# Patient Record
Sex: MALE | Race: WHITE | NOT HISPANIC OR LATINO | Employment: FULL TIME | URBAN - METROPOLITAN AREA
[De-identification: names, ages, dates, MRNs, and addresses within clinical notes are randomized per-mention and may not be internally consistent; named-entity substitution may affect disease eponyms.]

---

## 2018-04-26 ENCOUNTER — HOSPITAL ENCOUNTER (EMERGENCY)
Facility: CLINIC | Age: 38
Discharge: HOME | End: 2018-04-26
Attending: EMERGENCY MEDICINE | Admitting: EMERGENCY MEDICINE
Payer: COMMERCIAL

## 2018-04-26 ENCOUNTER — APPOINTMENT (OUTPATIENT)
Dept: GENERAL RADIOLOGY | Facility: CLINIC | Age: 38
End: 2018-04-26
Attending: EMERGENCY MEDICINE
Payer: COMMERCIAL

## 2018-04-26 VITALS
RESPIRATION RATE: 16 BRPM | TEMPERATURE: 97.5 F | OXYGEN SATURATION: 96 % | SYSTOLIC BLOOD PRESSURE: 102 MMHG | DIASTOLIC BLOOD PRESSURE: 73 MMHG

## 2018-04-26 DIAGNOSIS — R07.89 ATYPICAL CHEST PAIN: ICD-10-CM

## 2018-04-26 LAB
INTERPRETATION ECG - MUSE: NORMAL
TROPONIN I SERPL-MCNC: <0.015 UG/L (ref 0–0.04)

## 2018-04-26 PROCEDURE — 84484 ASSAY OF TROPONIN QUANT: CPT | Performed by: EMERGENCY MEDICINE

## 2018-04-26 PROCEDURE — 99285 EMERGENCY DEPT VISIT HI MDM: CPT | Mod: 25

## 2018-04-26 PROCEDURE — 93005 ELECTROCARDIOGRAM TRACING: CPT

## 2018-04-26 PROCEDURE — 36415 COLL VENOUS BLD VENIPUNCTURE: CPT | Performed by: EMERGENCY MEDICINE

## 2018-04-26 PROCEDURE — 71046 X-RAY EXAM CHEST 2 VIEWS: CPT

## 2018-04-26 ASSESSMENT — ENCOUNTER SYMPTOMS
SHORTNESS OF BREATH: 0
LIGHT-HEADEDNESS: 0
VOMITING: 0
NAUSEA: 0
ABDOMINAL PAIN: 0
FEVER: 0
CHILLS: 0

## 2018-04-26 NOTE — DISCHARGE INSTRUCTIONS
*CHEST PAIN, UNCERTAIN CAUSE    Based on your exam today, the exact cause of your chest pain is not certain. Your condition does not seem serious at this time, and your pain does not appear to be coming from your heart. However, sometimes the signs of a serious problem take more time to appear. Therefore, watch for the warning signs listed below.  HOME CARE:    1. Rest today and avoid strenuous activity.  2. Take any prescribed medicine as directed.  FOLLOW UP with your doctor in 1-3 days.   GET PROMPT MEDICAL ATTENTION if any of the following occur:    A change in the type of pain: if it feels different, becomes more severe, lasts longer, or begins to spread into your shoulder, arm, neck, jaw or back    Shortness of breath or increased pain with breathing    Weakness, dizziness, or fainting    Cough with blood or dark colored sputum (phlegm)    Fever over 101  F (38.3  C)    Swelling, pain or redness in one leg    9902-4109 [employer]. [address] [place] [address]. All rights reserved. This information is not intended as a substitute for professional medical care. Always follow your healthcare professional's instructions.  This information has been modified by your health care provider with permission from the publisher.

## 2018-04-26 NOTE — ED PROVIDER NOTES
History     Chief Complaint:  Chest pain     HPI   Deon Chaparro is a 37-year-old male with history of hypothyroidism who presents to the emergency room for evaluation of left side pain that has been intermittent for the last 2 weeks but was worse tonight.  The patient reports that the pain is right under his left armpit and does travel up into his left deltoid and left neck.  He states that today he also felt a little bit fatigued and had a headache, but he denies any nausea, vomiting, shortness of breath, diaphoresis, abdominal pain, fever, cough, or other symptoms.  The patient has had no injury or trauma to the area.  He states that the pain is not worse with movement and onset usually happens while he is at rest.  The patient states that the pain is not worse when he is lying down or when he touches it.  He states that he has never had pain like this in the past before.  The patient reports that the pain had subsided prior to arrival tonight.      Cardiac Risk Factors      Sex:     Male   Tobacco:    POSITIVE (former smoker)    Hypertension:    Negative   Diabetes:    Negative   Hyperlipidemia:   Negative   Family History:   Negative       Allergies:  No known drug allergies.     Medications:    Levothyroxine     Past Medical History:    GERD  Hypothyroidism      Past Surgical History:    Dental surgery     Family History:    Diabetes   Kidney stones     Social History:  Marital Status:    Presents to the ED alone   Tobacco Use: Former smoker   Alcohol Use: Minimal   PCP: Sudhir Wood      Review of Systems   Constitutional: Negative for chills and fever.   Respiratory: Negative for shortness of breath.    Cardiovascular: Positive for chest pain (left side under armpit).   Gastrointestinal: Negative for abdominal pain, nausea and vomiting.   Neurological: Negative for light-headedness.   All other systems reviewed and are negative.    Physical Exam   First Vitals:  BP: 116/83  Heart Rate:  79  Temp: 97.5  F (36.4  C)  Resp: 16  SpO2: 99 %    Physical Exam  Constitutional: Alert, attentive, GCS 15, well appearing young male in no acute distress.           HENT: normocephalic, atraumatic   Eyes: Normal conjunctiva. Pupils are equal, round, and reactive to light.   CV: regular rate and rhythm; no murmurs, rubs or gallups  Chest: Effort normal and breath sounds normal. No wheezing, rhonchi, or rales. No reproducible chest wall pain. No crepitus.    GI:  There is no tenderness. No distension. Normal bowel sounds  MSK: Normal range of motion, no peripheral edema or calf tenderness to palpation  Neurological: Alert, attentive, oriented x4, strength grossly intact  Skin: Skin is warm and dry. No rashes.    Emergency Department Course   ECG:  @ 0017  Indication: Chest pain   Vent. Rate 66 bpm. VT interval 170 ms. QRS duration 88 ms. QT/QTc 392/410 ms. P-R-T axis 45 81 69.   Normal sinus rhythm. Normal ECG.  Read @ 0100 by Dr. Sepulveda.     Imaging:  XR Chest 2 Views  No infiltrates or other acute findings. Heart size is within normal limits. No pneumothorax.  Report per radiology:  Wilder Weber MD (04/26/18 03:02:12)    Radiographic findings were communicated with the patient who voiced understanding of the findings.    Laboratory:  Troponin I: <0.015 (WNL)      Emergency Department Course:  EKG was done, interpretation as above.     Nursing notes and vitals reviewed.    (0115) I entered the room with my scribe, obtained the history, and performed an exam of the patient as documented above.    EKG was done, interpretation as above.     A peripheral IV was established. Blood was drawn from the patient. This was sent for laboratory testing, findings above.      The patient was sent for a chest x-ray while in the emergency department, findings above.      (0307) I personally reviewed the results with the patient and answered all related questions prior to discharge.       Findings and plan explained to the  patient. Patient discharged home with instructions regarding supportive care, medications, and reasons to return. The importance of close follow-up was reviewed.     Impression & Plan    Medical Decision Making:  Deon Chaparro is a 37 year old male who presents with chest pain in left axilla.  The work up in the Emergency Department is negative.  I considered a broad differential diagnosis in this patient including life-threatening etiologies such as acute coronary syndrome, myocardial infarction, pulmonary embolism, acute aortic dissection, myocarditis, pericarditis, acute valvular insufficiency amongst others.  Other causes considered for this patient included pneumonia, pneumothorax, chest wall source, pericarditis, pleurisy, esophageal spasm, etc.  No serious etiology for the chest pain were detected today during this visit.  Troponin and EKG reassuring (multiple days of symptoms).  PERC negative.  CXR is reassuring.  Close follow up with primary care is indicated should the pain continue, as further work up may be performed; this was made clear to the patient, who understands.     Diagnosis:    ICD-10-CM   1. Atypical chest pain R07.89     Disposition:  Discharge        [facility] EMERGENCY DEPARTMENT      Scribe disclosure:  I, Quique Jolly, am serving as a scribe on 4/26/2018 at 1:15 AM to personally document services performed by Dr. Sepulveda based on my observations and the provider's statements to me.                Patricia Sepulveda MD  04/26/18 0116

## 2018-04-26 NOTE — ED AVS SNAPSHOT
[facility] Emergency Department    [address]    [facility] [address]    Phone:  [phone]    Fax:  [phone]                                       Deon Chaparro   MRN: [id number]    Department:  [facility] Emergency Department   Date of Visit:  4/26/2018           Patient Information     Date Of Birth          1980        Your diagnoses for this visit were:     Atypical chest pain        You were seen by Patricia Sepulveda MD.      Follow-up Information     Follow up with [facility]. Schedule an appointment as soon as possible for a visit in 1 week.    Specialty:  Family Medicine    Why:  for recheck     Contact information:    [address] [facility] [address] [phone]        Follow up with [facility] Emergency Department.    Specialty:  EMERGENCY MEDICINE    Why:  If symptoms worsen    Contact information:    [address]  [facility] [id number] [phone]        Discharge Instructions          *CHEST PAIN, UNCERTAIN CAUSE    Based on your exam today, the exact cause of your chest pain is not certain. Your condition does not seem serious at this time, and your pain does not appear to be coming from your heart. However, sometimes the signs of a serious problem take more time to appear. Therefore, watch for the warning signs listed below.  HOME CARE:    1. Rest today and avoid strenuous activity.  2. Take any prescribed medicine as directed.  FOLLOW UP with your doctor in 1-3 days.   GET PROMPT MEDICAL ATTENTION if any of the following occur:    A change in the type of pain: if it feels different, becomes more severe, lasts longer, or begins to spread into your shoulder, arm, neck, jaw or back    Shortness of breath or increased pain with breathing    Weakness, dizziness, or fainting    Cough with blood or dark colored sputum (phlegm)    Fever over 101  F (38.3  C)    Swelling,  pain or redness in one leg    6688-3067 [employer]. [address] [place] [address], [place] [address]. All rights reserved. This information is not intended as a substitute for professional medical care. Always follow your healthcare professional's instructions.  This information has been modified by your health care provider with permission from the publisher.      24 Hour Appointment Hotline       To make an appointment at any [facility], call [phone] ([phone]). If you don't have a family doctor or clinic, we will help you find one. [facility] are conveniently located to serve the needs of you and your family.             Review of your medicines      Our records show that you are taking the medicines listed below. If these are incorrect, please call your family doctor or clinic.        Dose / Directions Last dose taken    IBUPROFEN PO        Refills:  0        LEVOTHYROXINE SODIUM PO   Dose:  125 mcg        Take 125 mcg by mouth   Refills:  0                Procedures and tests performed during your visit     Troponin I (now)    XR Chest 2 Views      Orders Needing Specimen Collection     None      Pending Results     Date and Time Order Name Status Description    4/26/2018 0151 XR Chest 2 Views Preliminary             Pending Culture Results     No orders found from 4/24/2018 to 4/27/2018.            Pending Results Instructions     If you had any lab results that were not finalized at the time of your Discharge, you can call the ED Lab Result RN at [phone]. You will be contacted by this team for any positive Lab results or changes in treatment. The nurses are available 7 days a week from 10A to 6:30P.  You can leave a message 24 hours per day and they will return your call.        Test Results From Your Hospital Stay        4/26/2018  2:03 AM      Component Results     Component Value Ref Range & Units Status    Troponin I ES <0.015 0.000 - 0.045 ug/L Final    The 99th  percentile for upper reference range is 0.045 ug/L.  Troponin values   in the range of 0.045 - 0.120 ug/L may be associated with risks of adverse   clinical events.           4/26/2018  2:28 AM      Narrative     XR CHEST 2 VW  4/26/2018 2:23 AM     INDICATION: Left chest pain, evaluate for pneumothorax.    COMPARISON: None.        Impression     IMPRESSION: No infiltrates or other acute findings. Heart size is  within normal limits. No pneumothorax.                Clinical Quality Measure: Blood Pressure Screening     Your blood pressure was checked while you were in the emergency department today. The last reading we obtained was  BP: 102/73 . Please read the guidelines below about what these numbers mean and what you should do about them.  If your systolic blood pressure (the top number) is less than 120 and your diastolic blood pressure (the bottom number) is less than 80, then your blood pressure is normal. There is nothing more that you need to do about it.  If your systolic blood pressure (the top number) is 120-139 or your diastolic blood pressure (the bottom number) is 80-89, your blood pressure may be higher than it should be. You should have your blood pressure rechecked within a year by a primary care provider.  If your systolic blood pressure (the top number) is 140 or greater or your diastolic blood pressure (the bottom number) is 90 or greater, you may have high blood pressure. High blood pressure is treatable, but if left untreated over time it can put you at risk for heart attack, stroke, or kidney failure. You should have your blood pressure rechecked by a primary care provider within the next 4 weeks.  If your provider in the emergency department today gave you specific instructions to follow-up with your doctor or provider even sooner than that, you should follow that instruction and not wait for up to 4 weeks for your follow-up visit.        Thank you for choosing [facility]       Thank you for  "choosing [facility] for your care. Our goal is always to provide you with excellent care. Hearing back from our patients is one way we can continue to improve our services. Please take a few minutes to complete the written survey that you may receive in the mail after you visit with us. Thank you!        [employer] Information     [employer] lets you send messages to your doctor, view your test results, renew your prescriptions, schedule appointments and more. To sign up, go to [email or web address] . Click on \"Log in\" on the left side of the screen, which will take you to the Welcome page. Then click on \"Sign up Now\" on the right side of the page.     You will be asked to enter the access code listed below, as well as some personal information. Please follow the directions to create your username and password.     Your access code is: [email or web address]  Expires: 2018  3:00 AM     Your access code will  in 90 days. If you need help or a new code, please call your [facility] clinic or [phone].        Care EveryWhere ID     This is your Care EveryWhere ID. This could be used by other organizations to access your [facility] medical records  [id number]        Equal Access to Services     [place] : Margarette Marin, tigre arvizu, qaloly kaemili lechuga, lamar herron. So [facility] [phone].    ATENCIÓN: Si habla español, tiene a esquivel disposición servicios gratuitos de asistencia lingüística. Llame al [phone].    We comply with applicable federal civil rights laws and [place] laws. We do not discriminate on the basis of race, color, national origin, age, disability, sex, sexual orientation, or gender identity.            After Visit Summary       This is your record. Keep this with you and show to your community pharmacist(s) and doctor(s) at your next visit.                  "

## 2018-04-26 NOTE — ED TRIAGE NOTES
"Patient here for evaluation of CP under left armpit for several weeks. Has been a \"minor annoyance.\" Tonight, intensity increased and is more persistent.   "

## 2018-04-26 NOTE — ED AVS SNAPSHOT
[facility] Emergency Department    [address]    [facility] [address]    Phone:  [phone]    Fax:  [phone]                                       Deon Chaparro   MRN: [id number]    Department:  [facility] Emergency Department   Date of Visit:  4/26/2018           After Visit Summary Signature Page     I have received my discharge instructions, and my questions have been answered. I have discussed any challenges I see with this plan with the nurse or doctor.    ..........................................................................................................................................  Patient/Patient Representative Signature      ..........................................................................................................................................  Patient Representative Print Name and Relationship to Patient    ..................................................               ................................................  Date                                            Time    ..........................................................................................................................................  Reviewed by Signature/Title    ...................................................              ..............................................  Date                                                            Time

## 2019-07-10 ENCOUNTER — HOSPITAL ENCOUNTER (EMERGENCY)
Facility: CLINIC | Age: 39
Discharge: HOME | End: 2019-07-11
Attending: PHYSICIAN ASSISTANT | Admitting: PHYSICIAN ASSISTANT
Payer: COMMERCIAL

## 2019-07-10 VITALS
TEMPERATURE: 97.9 F | RESPIRATION RATE: 18 BRPM | DIASTOLIC BLOOD PRESSURE: 80 MMHG | OXYGEN SATURATION: 100 % | SYSTOLIC BLOOD PRESSURE: 127 MMHG | WEIGHT: 207.23 LBS

## 2019-07-10 DIAGNOSIS — H60.501 ACUTE OTITIS EXTERNA OF RIGHT EAR, UNSPECIFIED TYPE: ICD-10-CM

## 2019-07-10 PROCEDURE — 99282 EMERGENCY DEPT VISIT SF MDM: CPT

## 2019-07-10 NOTE — ED AVS SNAPSHOT
[facility] Emergency Department  [address]  [facility] [address]  Phone:  [phone]  Fax:  [phone]                                    Deon Chaparro   MRN: [id number]    Department:  [facility] Emergency Department   Date of Visit:  7/10/2019           After Visit Summary Signature Page    I have received my discharge instructions, and my questions have been answered. I have discussed any challenges I see with this plan with the nurse or doctor.    ..........................................................................................................................................  Patient/Patient Representative Signature      ..........................................................................................................................................  Patient Representative Print Name and Relationship to Patient    ..................................................               ................................................  Date                                   Time    ..........................................................................................................................................  Reviewed by Signature/Title    ...................................................              ..............................................  Date                                               Time          22EPIC Rev 08/18

## 2019-07-11 RX ORDER — NEOMYCIN SULFATE, POLYMYXIN B SULFATE, HYDROCORTISONE 3.5; 10000; 1 MG/ML; [USP'U]/ML; MG/ML
3 SOLUTION/ DROPS AURICULAR (OTIC) 4 TIMES DAILY
Qty: 10 ML | Refills: 0 | Status: SHIPPED | OUTPATIENT
Start: 2019-07-11 | End: 2022-09-16

## 2019-07-11 NOTE — ED PROVIDER NOTES
History     Chief Complaint:  Otalgia    HPI   Deon Chaparro is a 38 year old male who presents to the emergency department with right ear otalgia. He reports the pain began last night and is now radiating down towards his neck which prompted him to present to the ED. He states he has not had this pain before. Additionally, the patient recently had his right ear irrigated by his primary provider.Denies fevers, drainage from the ears, or other concerns.     Allergies:  NKDA    Medications:    Levothyroxine    Past Medical History:    Esophageal Reflux  Thyroid disease    Past Surgical History:    Dental surgery    Family History:    Genitourinary problems    Social History:  Former smoker  Positive for alcohol use. Comment: minimal  Marital Status:   [2]       Review of Systems   HENT: Positive for ear pain.    All other systems reviewed and are negative.      Physical Exam     Patient Vitals for the past 24 hrs:   BP Temp Temp src Heart Rate Resp SpO2 Weight   07/10/19 2357 127/80 97.9  F (36.6  C) Temporal 70 18 100 % 94 kg (207 lb 3.7 oz)     Physical Exam  General: Alert, interactive. GCS 15  Head:  Scalp is atraumatic.  Eyes:  EOM intact. The pupils are equal, round, and reactive to light. No scleral icterus.   ENT:                                      Ears:   Left external canals normal.  Right external canal swollen with narrowing of the canal. Purulent discharge. Visualized TM normal without out erythema.   Nose:  The external nose is normal.  Throat:  The oropharynx is normal. Mucus membranes are moist.                 Neck:  Normal range of motion. There is no rigidity. No LAD.    CV:  Regular rate and rhythm. No murmur. 2+ radial pulses  Resp:  Breath sounds are clear bilaterally. Non-labored, no retractions or accessory muscle use.  MS:  Normal range of motion.   Skin:  Warm and dry.   Neuro:  Strength and sensation grossly intact.   Psych:  Awake. Alert.  Appropriate interactions.        Emergency Department Course   Emergency Department Course:  Nursing notes and vitals reviewed. (0014) I performed an exam of the patient as documented above.     I rechecked the patient and discussed the results of his workup thus far.      Findings and plan explained to the Patient. Patient discharged home with instructions regarding supportive care, medications, and reasons to return. The importance of close follow-up was reviewed. The patient was prescribed Cortisporin     I personally reviewed the laboratory results with the Patient and answered all related questions prior to discharge.     Impression & Plan    Medical Decision Making:  Deon Chaparro is a 38 year old male who presents for evaluation of otalgia on the right side.  The patient has an exam consistent with otitis externa.  Differential considered in this patient with otalgia included mastoiditis, meningitis, perforation, cerumen impaction, mass, dental abscess, or peritonsillar abscess, referred pain, cholesteatoma, otitis externa, etc.   Drops for the externa are noted below. Return if increasing pain, fever, decrease in hearing or ear discharge.  Follow-up with primary physician in 3-5  days, if symptoms persist and ENT consultation may be needed as outpatient.      Diagnosis:    ICD-10-CM    1. Acute otitis externa of right ear, unspecified type H60.501        Disposition:  discharged to home    Discharge Medications:     Medication List      Started    neomycin-polymyxin-hydrocortisone 3.5-74308-2 otic solution  Commonly known as:  CORTISPORIN  3 drops, Right Ear, 4 TIMES DAILY        Scribe Disclosure:  IMahdi Collier, am serving as a scribe on 7/11/2019 at 2:24 AM to personally document services performed by Tracey Campos PA-C providers found based on my observations and the provider's statements to me.     7/10/2019   [facility] EMERGENCY DEPARTMENT       Tracey Campos PA-C  07/11/19 2159

## 2019-07-11 NOTE — DISCHARGE INSTRUCTIONS
"Discharge Instructions  Otitis Externa    Today you were seen for otitis externa which is a condition that occurs when the ear canal, which is the area that leads from the outer ear to the ear drum, becomes irritated as a result of an infection, allergy, or skin problem.   The most common symptoms of this are:  pain in the outer ear, especially when the ear is moved, itchiness of the ear, fluid or pus leaking from the ear and difficulty hearing clearly.  \"Swimmer's ear\" is the name for external otitis that occurs in a person who swims frequently.    Generally, every Emergency Department visit should have a follow-up clinic visit with either a primary or a specialty clinic/provider. Please follow-up as instructed by your emergency provider today.    Return to the Emergency Department if:  Your symptoms get worse, or if you develop a severe headache, stiff neck, or new symptoms.  The pain and swelling spread to your face.  You develop high fever.      Treatment:  Treatment of otitis externa aims to reduce pain and eliminate the infection.   You should take either Advil  (ibuprofen) or Tylenol  (acetaminophen) for control of the ear pain.    Ear drops -- Ear drops are usually prescribed to both reduce pain and swelling and kill the bacteria which causes external otitis. It is important to apply the ear drops correctly so that they reach the ear canal:  Lie on your side or tilt your head towards the opposite shoulder.  Fill the ear canal with drops.  Lie on your side for 20 minutes or place a cotton ball in the ear canal for 20 minutes.  Finish the entire course of treatment, even if you begin to feel better within a few days.  Avoid getting ears wet -- during treatment, you should avoid getting the inside of your ears wet. While showering, you can place a cotton ball coated with petroleum jelly in the ear. However, you should not swim for 7 to 10 days after starting treatment. Avoid wearing hearing aids and in-ear " headphones until pain improves.  If a wick has been placed in your ear, please do not remove it until seen by your primary provider or Ear, Nose, and Throat (ENT) specialist as directed.    Prevention:  Do not clean ear canal. Ear wax serves to protect the ears from water, bacteria, and injury. Excessive cleaning or scratching can injure the skin, potentially leading to infection.  Swimming on a regular basis removes some of the ear wax, allowing water to soften the skin. Bacteria, which normally live in the ear canal, can then enter the skin more easily.  Wearing devices that block the ear canals, such as hearing aids, headphones, or ear plugs, can increase the risk of external otitis (if worn frequently) by injuring the skin.    If you swim frequently, experts recommend the following tips to reduce the chance of developing external otitis:  Shake your ears dry after swimming.  Use ear drops after swimming to prevent ear infections; these are available at most pharmacies without a prescription.  Consider wearing ear plugs made for swimming.  If you were given a prescription for medicine here today, be sure to read all of the information (including the package insert) that comes with your prescription.  This will include important information about the medicine, its side effects, and any warnings that you need to know about.  The pharmacist who fills the prescription can provide more information and answer questions you may have about the medicine.  If you have questions or concerns that the pharmacist cannot address, please call or return to the Emergency Department.   Remember that you can always come back to the Emergency Department if you are not able to see your regular provider in the amount of time listed above, if you get any new symptoms, or if there is anything that worries you.

## 2019-07-11 NOTE — ED TRIAGE NOTES
Pt got rt ear cleaned today at clinic, now with worsening pain to same.  Was not given abx rx at clinic.

## 2019-07-12 ENCOUNTER — HOSPITAL ENCOUNTER (EMERGENCY)
Facility: CLINIC | Age: 39
Discharge: HOME | End: 2019-07-13
Attending: EMERGENCY MEDICINE | Admitting: EMERGENCY MEDICINE
Payer: COMMERCIAL

## 2019-07-12 DIAGNOSIS — H60.391 INFECTIVE OTITIS EXTERNA, RIGHT: ICD-10-CM

## 2019-07-12 PROCEDURE — 99285 EMERGENCY DEPT VISIT HI MDM: CPT | Mod: 25

## 2019-07-12 PROCEDURE — 25000128 H RX IP 250 OP 636: Performed by: EMERGENCY MEDICINE

## 2019-07-12 PROCEDURE — 96372 THER/PROPH/DIAG INJ SC/IM: CPT

## 2019-07-12 RX ADMIN — HYDROMORPHONE HYDROCHLORIDE 1 MG: 1 INJECTION, SOLUTION INTRAMUSCULAR; INTRAVENOUS; SUBCUTANEOUS at 23:20

## 2019-07-12 NOTE — ED AVS SNAPSHOT
[facility] Emergency Department  [address]  [facility] [address]  Phone:  [phone]  Fax:  [phone]                                    Deon Chaparro   MRN: [id number]    Department:  [facility] Emergency Department   Date of Visit:  7/12/2019           After Visit Summary Signature Page    I have received my discharge instructions, and my questions have been answered. I have discussed any challenges I see with this plan with the nurse or doctor.    ..........................................................................................................................................  Patient/Patient Representative Signature      ..........................................................................................................................................  Patient Representative Print Name and Relationship to Patient    ..................................................               ................................................  Date                                   Time    ..........................................................................................................................................  Reviewed by Signature/Title    ...................................................              ..............................................  Date                                               Time          22EPIC Rev 08/18

## 2019-07-13 ENCOUNTER — APPOINTMENT (OUTPATIENT)
Dept: CT IMAGING | Facility: CLINIC | Age: 39
End: 2019-07-13
Attending: EMERGENCY MEDICINE
Payer: COMMERCIAL

## 2019-07-13 VITALS — OXYGEN SATURATION: 96 % | SYSTOLIC BLOOD PRESSURE: 104 MMHG | TEMPERATURE: 98 F | DIASTOLIC BLOOD PRESSURE: 76 MMHG

## 2019-07-13 PROCEDURE — 70480 CT ORBIT/EAR/FOSSA W/O DYE: CPT

## 2019-07-13 RX ORDER — OXYCODONE HYDROCHLORIDE 5 MG/1
5 TABLET ORAL EVERY 6 HOURS PRN
Qty: 12 TABLET | Refills: 0 | Status: SHIPPED | OUTPATIENT
Start: 2019-07-13 | End: 2022-09-16

## 2019-07-13 NOTE — ED PROVIDER NOTES
History     Chief Complaint:  Otalgia    HPI   Deon Chaparro is a 38 year old male who presents with right otalgia worsening despite being on ear drops. The patient has been seen for his ear pain 3 times in the last 3 days. He has been prescribed Cortisporin and now switched to ofloxacin and oral  Augmentin. Over all of the visits his infection has been the primary focus however his pain has been increasing in severity over time. With pain management as his goal, the patient presented to the ED for reevaluation. For pain management the patient has been alternating ibuprofen and tylenol the last dose was 600 mg of ibuprofen at 2030. He denied any drainage from his ear. His PCP saw him this morning and switched him to ofloxacin and oral Augmentin since his ear is nor more swollen than before. No fever at his Pcp's office this morning.    Allergies:  Shellfish      Medications:    The patient is currently on no regular medications.     Past Medical History:    Esophageal reflux   Thyroid disease       Past Surgical History:    Dental surgery    Family History:    Kidney stones     Social History:  Former smoker   Minimal alcohol use  Negative for drug use.    Marital Status:   [2]     Review of Systems   HENT: Positive for ear pain. Negative for ear discharge.    All other systems reviewed and are negative.        Physical Exam     Patient Vitals for the past 24 hrs:   BP Temp Temp src Heart Rate SpO2   07/13/19 0120 -- -- -- -- 96 %   07/13/19 0115 -- -- -- -- 94 %   07/13/19 0100 -- -- -- -- 93 %   07/13/19 0050 -- -- -- -- 91 %   07/13/19 0040 104/76 -- -- -- --   07/12/19 2246 (!) 120/97 98  F (36.7  C) Temporal 90 100 %       Physical Exam   Constitutional: He appears well-developed and well-nourished.   HENT:   Left Ear: External ear normal.   Mouth/Throat: Oropharynx is clear and moist. No oropharyngeal exudate.   TM's clear bilaterally. R TM hard to see. EAC is edematous and debris. No bleeding.  Soft tissue swelling behind the R ear. R Mastoid region slightly tender on exam   Eyes: Pupils are equal, round, and reactive to light. Conjunctivae and EOM are normal. No scleral icterus.   Neck: Normal range of motion. Neck supple.   Cardiovascular: Normal rate, regular rhythm, normal heart sounds and intact distal pulses. Exam reveals no gallop and no friction rub.   No murmur heard.  Pulmonary/Chest: Effort normal and breath sounds normal. No respiratory distress. He has no wheezes. He has no rales.   Abdominal: Soft. Bowel sounds are normal. He exhibits no distension and no mass. There is no tenderness.   Musculoskeletal: He exhibits no edema.   Lymphadenopathy:     He has no cervical adenopathy.   Neurological: He is alert.   Skin: Skin is warm and dry. No rash noted.   Psychiatric: He has a normal mood and affect.         Emergency Department Course   Imaging:  Radiographic findings were communicated with the patient who voiced understanding of the findings.  CT temporal bones without contrast:   Soft tissue swelling with no discrete collection involving surrounding mastoid air cells and extending along external auditory canal leading to moderate narrowing. Thickening and retraction of right tympanic membrane along with fluid and soft tissue   density in prusaks space and primarily meso and hypotympanic portions of the right middle ear. No obvious bony destruction.     2.  Left temporal bone region on unremarkable, as per radiology.    Interventions:  2320 Dilaudid 1mg IM    Emergency Department Course:  Nursing notes and vitals reviewed. (2304) I performed an exam of the patient as documented above.      Medicine administered as documented above    The patient was sent for a temporal bones CT while in the emergency department, findings above.      (0128) I rechecked the patient and discussed the results of his workup thus far. Ear wick placed in right ear.     Findings and plan explained to the Patient.  Patient discharged home with instructions regarding supportive care, medications, and reasons to return. The importance of close follow-up was reviewed. The patient was prescribed oxycodone.    I personally reviewed the laboratory results with the Patient and answered all related questions prior to discharge.     Impression & Plan      Medical Decision Making:  Patient presents with continually worsening of right ear pain. Despite being on ear drops, patient's ear canal are erythematous it is hard to see but there is definitely debris. He is slightly tender behind the ear near the mastoid but there is no redness or swelling in that area. He says he has worsening pain and swelling, so the concern is mastoiditis. There is no finding on CT to suggest that. He is reassured. He has ofloxacin drops and Augmentin so I have asked him to continue with that. Patient is reassured. He is referred to ENT and sent home with some oxycodone for pain.     Diagnosis:    ICD-10-CM    1. Infective otitis externa, right H60.391        Disposition:  discharged to home    Discharge Medications:     Medication List      Started    oxyCODONE 5 MG tablet  Commonly known as:  ROXICODONE  5 mg, Oral, EVERY 6 HOURS PRN          Scribe Disclosure:  Kaur MATTHEW, am serving as a scribe on 7/12/2019 at 11:04 PM to personally document services performed by Aneta Gardner MD based on my observations and the provider's statements to me.       Kaur Walton  7/12/2019   [facility] EMERGENCY DEPARTMENT       Aneta Gardner MD  07/13/19 1910

## 2019-07-13 NOTE — ED TRIAGE NOTES
Ear pain developed approx 2-3 days. Seen in ED and prescribed antibiotic drops. Reports pain is worse     Took 600mg ibuprofen

## 2019-07-13 NOTE — DISCHARGE INSTRUCTIONS
Opioid Medication Information    You have been given a prescription for an opioid (narcotic) pain medicine and/or have received a pain medicine while here in the Emergency Department. These medicines can make you drowsy or impaired. You must not drive, operate dangerous equipment, or engage in any other dangerous activities while taking these medications. If you drive while taking these medications, you could be arrested for DUI, or driving under the influence. Do not drink any alcohol while you are taking these medications.   Opioid pain medications can cause addiction. If you have a history of chemical dependency of any type, you are at a higher risk of becoming addicted to pain medications.  Only take these prescribed medications to treat your pain when all other options have been tried. Take it for as short a time and as few doses as possible. Store your pain pills in a secure place, as they are frequently stolen and provide a dangerous opportunity for children or visitors in your house to start abusing these powerful medications. We will not replace any lost or stolen medicine.  As soon as your pain is better, you should flush all your remaining medication.   Many prescription pain medications contain Tylenol  (acetaminophen), including Vicodin , Tylenol #3 , Norco , Lortab , and Percocet .  You should not take any extra pills of Tylenol  if you are using these prescription medications or you can get very sick.  Do not ever take more than 4000 mg of acetaminophen in any 24 hour period.  All opioids tend to cause constipation. Drink plenty of water and eat foods that have a lot of fiber, such as fruits, vegetables, prune juice, apple juice and high fiber cereal.  Take a laxative if you don t move your bowels at least every other day. Miralax , Milk of Magnesia, Colace , or Senna  can be used to keep you regular.

## 2022-09-16 ENCOUNTER — OFFICE VISIT (OUTPATIENT)
Dept: URGENT CARE | Facility: URGENT CARE | Age: 42
End: 2022-09-16
Payer: COMMERCIAL

## 2022-09-16 VITALS
SYSTOLIC BLOOD PRESSURE: 122 MMHG | OXYGEN SATURATION: 96 % | DIASTOLIC BLOOD PRESSURE: 78 MMHG | HEART RATE: 80 BPM | TEMPERATURE: 97.5 F

## 2022-09-16 DIAGNOSIS — H60.393 INFECTIVE OTITIS EXTERNA, BILATERAL: Primary | ICD-10-CM

## 2022-09-16 DIAGNOSIS — H92.02 DISCOMFORT OF LEFT EAR: ICD-10-CM

## 2022-09-16 DIAGNOSIS — H92.01 RIGHT EAR PAIN: ICD-10-CM

## 2022-09-16 PROCEDURE — 99203 OFFICE O/P NEW LOW 30 MIN: CPT | Performed by: PHYSICIAN ASSISTANT

## 2022-09-16 RX ORDER — NEOMYCIN SULFATE, POLYMYXIN B SULFATE, HYDROCORTISONE 3.5; 10000; 1 MG/ML; [USP'U]/ML; MG/ML
3 SOLUTION/ DROPS AURICULAR (OTIC) 4 TIMES DAILY
Qty: 5 ML | Refills: 0 | Status: SHIPPED | OUTPATIENT
Start: 2022-09-16 | End: 2022-09-23

## 2022-09-16 NOTE — PROGRESS NOTES
ASSESSMENT/PLAN:    (H60.393) Infective otitis externa, bilateral  (primary encounter diagnosis)  Plan: neomycin-polymyxin-hydrocortisone (CORTISPORIN)        [id number] otic solution    1. Antibiotic drops as per above   2.  Dry ear precautions until resolved   3. Follow-up with PCP if no improvement in 3 days, if symptoms change, worsen or fail to fully resolve with above treatment  4.  In addition to the above, urgent and emergent otitis externa signs and sym are reviewed with patient today both verbally and by way of printed educational material for home review.      (H92.01) Right ear pain      (H92.02) Discomfort of left ear  ---------------------------  SUBJECTIVE:    Deon Chaparro to  today for evaluation of right ear pain x 1+ weeks duration. He also reports intermittent pain radiating around right outer ear and intermittently into right jaw area.     Of note: Patient states he has had trouble with eczema of right ear canal. He was reportedly diagnosed with eustachian tube congestion approximately 1 weeks ago when he was seen for right ear pain. Despite use of allergy medication, pain has slowly worsened over the past week.           Past Medical History:   Diagnosis Date     Esophageal reflux 4/25/2008     Thyroid disease        Current Outpatient Medications   Medication     IBUPROFEN PO     LEVOTHYROXINE SODIUM PO     No current facility-administered medications for this visit.         Allergies   Allergen Reactions     Shellfish Allergy      ROS:   CONSTITUTIONAL: No acute onset fever,chills or severe weakness  HEENT: Positive as per above. No acute redness, heat or swelling around ear. No acute face or lateral neck swelling. No acute sore throat, difficulty breathing or difficulty swallowing.   CARDIAC: No acute onset chest pain or shortness of breath   RESP: No acute onset cough, chest pain or shortness of breath   GI: No acute onset abdominal pain. No acute onset nausea, vomiting or  diarrhea   SKIN: No acute skin blisters.  No acute  rash or lesions elsewhere   NEURO: No acute onset headaches, neck stiffness or lethargy.   RHEUM: No associated acute onset red, warm or swollen joints    OBJECTIVE:     /78 (BP Location: Right arm, Patient Position: Sitting, Cuff Size: Adult Regular)   Pulse 80   Temp 97.5  F (36.4  C) (Tympanic)   SpO2 96%     General appearance: alert and no apparent distress  SKIN: Specifically no vesicular rash on scalp, face, luis f-auricular area or face today. Skin color is uniform in color and without rash or hives.  HEENT:   Conjunctiva not injected.  Sclera clear.  Leftt external canal is mildly erythematous. Small amount of purulent debris present. I am still able to see left TM (normal in color and intact).  Mild discomfort with manipulation of tragus today. No luis f auricular swelling. No lateral neck or face swelling.   Right external canal is moderately erythematous and mildly edematous. I am unable to visualize right TM today due to purulent debris present. Mild discomfort with manipulation of tragus today. No luis f auricular swelling. No lateral neck or face swelling.   Nasal mucosa is normal.  Oropharyngeal exam is normal: no lesions, erythema, adenopathy or exudate.  NECK: Trachea is midline. No lateral neck swelling. Neck is supple, FROM with no adenopathy  CARDIAC:NORMAL - regular rate and rhythm without murmur.  RESP: Normal - CTA without rales, rhonchi, or wheezing.  NEURO: Alert and oriented.  Normal speech and mentation.  CN II/XII grossly intact.  Gait within normal limits.

## 2022-09-17 ENCOUNTER — APPOINTMENT (OUTPATIENT)
Dept: CT IMAGING | Facility: CLINIC | Age: 42
End: 2022-09-17
Attending: EMERGENCY MEDICINE
Payer: COMMERCIAL

## 2022-09-17 ENCOUNTER — HOSPITAL ENCOUNTER (EMERGENCY)
Facility: CLINIC | Age: 42
Discharge: HOME | End: 2022-09-17
Attending: EMERGENCY MEDICINE | Admitting: EMERGENCY MEDICINE
Payer: COMMERCIAL

## 2022-09-17 VITALS
HEART RATE: 85 BPM | DIASTOLIC BLOOD PRESSURE: 93 MMHG | SYSTOLIC BLOOD PRESSURE: 138 MMHG | OXYGEN SATURATION: 97 % | TEMPERATURE: 98.5 F | RESPIRATION RATE: 16 BRPM

## 2022-09-17 DIAGNOSIS — H60.502 ACUTE OTITIS EXTERNA OF LEFT EAR, UNSPECIFIED TYPE: ICD-10-CM

## 2022-09-17 DIAGNOSIS — R51.9 ACUTE NONINTRACTABLE HEADACHE, UNSPECIFIED HEADACHE TYPE: ICD-10-CM

## 2022-09-17 PROCEDURE — 70450 CT HEAD/BRAIN W/O DYE: CPT

## 2022-09-17 PROCEDURE — 96372 THER/PROPH/DIAG INJ SC/IM: CPT | Performed by: EMERGENCY MEDICINE

## 2022-09-17 PROCEDURE — 99285 EMERGENCY DEPT VISIT HI MDM: CPT | Mod: 25

## 2022-09-17 PROCEDURE — 250N000011 HC RX IP 250 OP 636: Performed by: EMERGENCY MEDICINE

## 2022-09-17 PROCEDURE — 250N000013 HC RX MED GY IP 250 OP 250 PS 637: Performed by: EMERGENCY MEDICINE

## 2022-09-17 RX ORDER — KETOROLAC TROMETHAMINE 30 MG/ML
30 INJECTION, SOLUTION INTRAMUSCULAR; INTRAVENOUS ONCE
Status: COMPLETED | OUTPATIENT
Start: 2022-09-17 | End: 2022-09-17

## 2022-09-17 RX ORDER — ONDANSETRON 4 MG/1
4 TABLET, ORALLY DISINTEGRATING ORAL ONCE
Status: COMPLETED | OUTPATIENT
Start: 2022-09-17 | End: 2022-09-17

## 2022-09-17 RX ORDER — GABAPENTIN 300 MG/1
300 CAPSULE ORAL ONCE
Status: COMPLETED | OUTPATIENT
Start: 2022-09-17 | End: 2022-09-17

## 2022-09-17 RX ORDER — GABAPENTIN 100 MG/1
100-300 CAPSULE ORAL 3 TIMES DAILY PRN
Qty: 30 CAPSULE | Refills: 0 | Status: SHIPPED | OUTPATIENT
Start: 2022-09-17

## 2022-09-17 RX ADMIN — GABAPENTIN 300 MG: 300 CAPSULE ORAL at 20:30

## 2022-09-17 RX ADMIN — ONDANSETRON 4 MG: 4 TABLET, ORALLY DISINTEGRATING ORAL at 20:30

## 2022-09-17 RX ADMIN — KETOROLAC TROMETHAMINE 30 MG: 30 INJECTION, SOLUTION INTRAMUSCULAR at 21:29

## 2022-09-17 ASSESSMENT — ENCOUNTER SYMPTOMS
NECK PAIN: 1
HEADACHES: 1

## 2022-09-17 ASSESSMENT — ACTIVITIES OF DAILY LIVING (ADL): ADLS_ACUITY_SCORE: 35

## 2022-09-18 NOTE — DISCHARGE INSTRUCTIONS
Bedside report received from Basia OVERTON. Assumed care. POC discussed. Pt resting comfortably in bed. Safety precautions in place.     Discharge Instructions  Headache    You were seen today for a headache. Headaches may be caused by many different things such as muscle tension, sinus inflammation, anxiety and stress, having too little sleep, too much alcohol, some medical conditions or injury. You may have a migraine, which is caused by changes in the blood vessels in your head.  At this time your provider does not find that your headache is a sign of anything dangerous or life-threatening.  However, sometimes the signs of serious illness do not show up right away.      Generally, every Emergency Department visit should have a follow-up clinic visit with either a primary or a specialty clinic/provider. Please follow-up as instructed by your emergency provider today.    Return to the Emergency Department if:  You get a new fever of 100.4 F or higher.  Your headache gets much worse.  You get a stiff neck with your headache.  You get a new headache that is significantly different or worse than headaches you have had before.  You are vomiting (throwing up) and cannot keep food or water down.  You have blurry or double vision or other problems with your eyes.  You have a new weakness on one side of your body.  You have difficulty with balance which is new.  You or your family thinks you are confused.  You have a seizure.    What can I do to help myself?  Pain medications - You may take a pain medication such as Tylenol  (acetaminophen), Advil , Motrin  (ibuprofen) or Aleve  (naproxen).  Take a pain reliever as soon as you notice symptoms.  Starting medications as soon as you start to have symptoms may lessen the amount of pain you have.  Relaxing in a quiet, dark room may help.  Get enough sleep and eat meals regularly.  You may need to watch for certain foods or other things which may trigger your headaches.  Keeping a journal of your headaches and possible triggers may help you and your primary provider to identify things which you should avoid which  may be causing your headaches.  If you were given a prescription for medicine here today, be sure to read all of the information (including the package insert) that comes with your prescription.  This will include important information about the medicine, its side effects, and any warnings that you need to know about.  The pharmacist who fills the prescription can provide more information and answer questions you may have about the medicine.  If you have questions or concerns that the pharmacist cannot address, please call or return to the Emergency Department.   Remember that you can always come back to the Emergency Department if you are not able to see your regular provider in the amount of time listed above, if you get any new symptoms, or if there is anything that worries you.

## 2022-09-18 NOTE — ED PROVIDER NOTES
History   Chief Complaint:  Headache and Otalgia       The history is provided by the patient.      Deon Chaparro is a 42 year old male with history of hypothyroidism who presents with right-sided ear pain for the past week and an headache. On the onset of his symptom he went to urgent care and was diagnosed with an eustachian tube dysfunction. He has been taking Flonase, antihistamines, and using a hot pad with no pain relief. Yesterday he reported to urgent care again, diagnosed with otitis externa, and prescribed cortisporin drops. Today he developed a severe headache with yellow drainage from his ear. He is unsure if the drainage is related to his ear drops or not. Additionally the pain is now radiating into his throat, neck, and top of his head. He has chills as well. He denies a fever, cough, dental pain, or visual disturbances. He is not a swimmer, does not put things in his ears, and has no known trigger for his pain. He has been alternating Tylenol and ibuprofen. His latest dose of ibuprofen was at noon and Tylenol at 1800. He had an ear infection 2 years ago. He did not have T tubes. He is not a smoker.     Review of Systems   HENT: Positive for ear pain (right-sided). Negative for congestion.    Musculoskeletal: Positive for neck pain.   Neurological: Positive for headaches.   All other systems reviewed and are negative.    Allergies:  Shellfish Allergy    Medications:  Cortisporin  Levothyroxine    Past Medical History:     Esophageal reflux   Asthma  Hypothyroidism  Depression  Genital warts  Deviated septum    Past Surgical History:    Dental surgery    Family History:    Father: kidney stones, melanoma, diabetes, heart attack  Mother: stroke, brain tumor, thyroid disease  Sister: thyroid disease    Social History:  He reports to the ED alone.   He denies smoking.     Physical Exam     Patient Vitals for the past 24 hrs:   BP Temp Pulse Resp SpO2   09/17/22 1928 (!) 138/93 98.5  F (36.9  C) 85  16 97 %       Physical Exam  General: Uncomfortable appearing adult male sitting upright  Eyes: PERRL, Conjunctive within normal limits.  EOMI.  HENT: No scalp tenderness, specifically over the right temporal region and mastoid area.  No palpable edema or fluctuance over the mastoid region on the right.  There is light yellowish material lining the right external auditory canal with mild underlying erythema.  Right TM is normal in appearance.  Left TM and external auditory canal normal in appearance moist mucous membranes, oropharynx clear.  No posterior pharyngeal erythema, edema or exudate.  Speech is normal.  Neck: No rigidity.  No lymphadenopathy.  No palpable mass.  CV: Normal S1S2, no murmur, rub or gallop. Regular rate and rhythm  Resp: Normal respiratory effort.  MSK:  Normal active range of motion.  Skin: Warm and dry. No rashes or lesions or ecchymoses on visible skin.  Neuro: Alert and oriented. Responds appropriately to all questions and commands. No focal findings appreciated. Normal muscle tone.  Psych: Appropriate mood and affect.    Emergency Department Course     Imaging:  CT Head w/o Contrast   Final Result   IMPRESSION:   1.  No evidence of acute hemorrhage or other acute intracranial abnormality.   2.  No evidence of mastoid or middle ear effusions.        Report per radiology    Emergency Department Course:    Reviewed:  I reviewed nursing notes, vitals, past medical history and Care Everywhere    Assessments:  1952 I obtained history and examined the patient as noted above.   2145 I rechecked the patient and explained findings. He is feeling much better at this time.     Interventions:  2030 Neurontin 300 mg PO  2030 Zofran 4 mg PO  2129 Toradol 30 mg IM    Disposition:  The patient was discharged to home.     Impression & Plan     Medical Decision Making:  Deon Chaparro is a 42 year old male presented with a severe headache in the setting of right ear pain and recent diagnosis of otitis  externa.  Evaluation in the emergency department has been negative for findings aside from those with otitis externa without involvement of the TM/middle ear/mastoids. The patient has not had any fever or neck stiffness so I doubt meningitis.  The headache was gradual in onset and like previous headaches so I doubt SAH. There is no associated numbness, paresthesia or confusion and I doubt stroke or CNS tumor. Ct head unremarkable including assessment of the middle ear and mastoids. Head pain described seemed possibly neuropathic in origin and gabapentin did help with symptoms. No clinical or historical evidence of GCA. Unclear if ear and head symptoms are related.  PRN use of gabapentin, NSAIDs and tylenol.   The patient should follow-up with the primary physician within 3 days. If the headache continues or the frequency increases, consultation with neurology will be indicated. If ear symptoms persist ENT assessment will be indicated. Return if increasing pain, fever, vomiting or weakness.      Diagnosis:    ICD-10-CM    1. Acute nonintractable headache, unspecified headache type  R51.9    2. Acute otitis externa of left ear, unspecified type  H60.502        Discharge Medications:  New Prescriptions    GABAPENTIN (NEURONTIN) 100 MG CAPSULE    Take 1-3 capsules (100-300 mg) by mouth 3 times daily as needed for neuropathic pain (headache)       Scribe Disclosure:  I, ZENAIDA DARYL, am serving as a scribe at 7:52 PM on 9/17/2022 to document services personally performed by Maria Luisa Orosco MD based on my observations and the provider's statements to me.        Maria Luisa Orosco MD  09/18/22 0121

## 2022-09-18 NOTE — ED TRIAGE NOTES
Rt ear pain x 1 week.  Seen at  yesterday, diagnosed with otitis externa and started on cortisporin drops.  Today pain has traveled to Lt ear and pt reports severe headache.  Took ibuprofen at 1200 and tylenol at 1800

## 2022-09-22 ENCOUNTER — HOSPITAL ENCOUNTER (EMERGENCY)
Facility: CLINIC | Age: 42
Discharge: HOME | End: 2022-09-22
Attending: EMERGENCY MEDICINE | Admitting: EMERGENCY MEDICINE
Payer: COMMERCIAL

## 2022-09-22 VITALS
RESPIRATION RATE: 16 BRPM | DIASTOLIC BLOOD PRESSURE: 80 MMHG | OXYGEN SATURATION: 98 % | SYSTOLIC BLOOD PRESSURE: 104 MMHG | HEART RATE: 63 BPM | TEMPERATURE: 97.5 F

## 2022-09-22 DIAGNOSIS — G51.0 BELL'S PALSY: ICD-10-CM

## 2022-09-22 LAB
ANION GAP SERPL CALCULATED.3IONS-SCNC: 9 MMOL/L (ref 7–15)
BASOPHILS # BLD AUTO: 0 10E3/UL (ref 0–0.2)
BASOPHILS NFR BLD AUTO: 1 %
BUN SERPL-MCNC: 15.1 MG/DL (ref 6–20)
CALCIUM SERPL-MCNC: 9.7 MG/DL (ref 8.6–10)
CHLORIDE SERPL-SCNC: 102 MMOL/L (ref 98–107)
CREAT SERPL-MCNC: 1.08 MG/DL (ref 0.67–1.17)
DEPRECATED HCO3 PLAS-SCNC: 29 MMOL/L (ref 22–29)
EOSINOPHIL # BLD AUTO: 0.3 10E3/UL (ref 0–0.7)
EOSINOPHIL NFR BLD AUTO: 5 %
ERYTHROCYTE [DISTWIDTH] IN BLOOD BY AUTOMATED COUNT: 12.1 % (ref 10–15)
GFR SERPL CREATININE-BSD FRML MDRD: 88 ML/MIN/1.73M2
GLUCOSE SERPL-MCNC: 86 MG/DL (ref 70–99)
HCT VFR BLD AUTO: 47.3 % (ref 40–53)
HGB BLD-MCNC: 15.9 G/DL (ref 13.3–17.7)
HOLD SPECIMEN: NORMAL
HOLD SPECIMEN: NORMAL
IMM GRANULOCYTES # BLD: 0 10E3/UL
IMM GRANULOCYTES NFR BLD: 0 %
LYMPHOCYTES # BLD AUTO: 1.7 10E3/UL (ref 0.8–5.3)
LYMPHOCYTES NFR BLD AUTO: 29 %
MCH RBC QN AUTO: 30.2 PG (ref 26.5–33)
MCHC RBC AUTO-ENTMCNC: 33.6 G/DL (ref 31.5–36.5)
MCV RBC AUTO: 90 FL (ref 78–100)
MONOCYTES # BLD AUTO: 0.4 10E3/UL (ref 0–1.3)
MONOCYTES NFR BLD AUTO: 8 %
NEUTROPHILS # BLD AUTO: 3.3 10E3/UL (ref 1.6–8.3)
NEUTROPHILS NFR BLD AUTO: 57 %
NRBC # BLD AUTO: 0 10E3/UL
NRBC BLD AUTO-RTO: 0 /100
PLATELET # BLD AUTO: 283 10E3/UL (ref 150–450)
POTASSIUM SERPL-SCNC: 4.4 MMOL/L (ref 3.4–5.3)
RBC # BLD AUTO: 5.26 10E6/UL (ref 4.4–5.9)
SODIUM SERPL-SCNC: 140 MMOL/L (ref 136–145)
WBC # BLD AUTO: 5.7 10E3/UL (ref 4–11)

## 2022-09-22 PROCEDURE — 36415 COLL VENOUS BLD VENIPUNCTURE: CPT | Performed by: EMERGENCY MEDICINE

## 2022-09-22 PROCEDURE — 99284 EMERGENCY DEPT VISIT MOD MDM: CPT

## 2022-09-22 PROCEDURE — 250N000012 HC RX MED GY IP 250 OP 636 PS 637: Performed by: EMERGENCY MEDICINE

## 2022-09-22 PROCEDURE — 86618 LYME DISEASE ANTIBODY: CPT | Performed by: EMERGENCY MEDICINE

## 2022-09-22 PROCEDURE — 85025 COMPLETE CBC W/AUTO DIFF WBC: CPT | Performed by: EMERGENCY MEDICINE

## 2022-09-22 PROCEDURE — 80048 BASIC METABOLIC PNL TOTAL CA: CPT | Performed by: EMERGENCY MEDICINE

## 2022-09-22 RX ORDER — PREDNISONE 20 MG/1
20 TABLET ORAL ONCE
Status: DISCONTINUED | OUTPATIENT
Start: 2022-09-22 | End: 2022-09-22 | Stop reason: HOSPADM

## 2022-09-22 RX ORDER — POLYVINYL ALCOHOL 14 MG/ML
1 SOLUTION/ DROPS OPHTHALMIC PRN
Qty: 15 ML | Refills: 0 | Status: SHIPPED | OUTPATIENT
Start: 2022-09-22

## 2022-09-22 RX ORDER — PREDNISONE 20 MG/1
60 TABLET ORAL ONCE
Status: COMPLETED | OUTPATIENT
Start: 2022-09-22 | End: 2022-09-22

## 2022-09-22 RX ORDER — PREDNISONE 20 MG/1
60 TABLET ORAL ONCE
Status: DISCONTINUED | OUTPATIENT
Start: 2022-09-22 | End: 2022-09-22

## 2022-09-22 RX ORDER — FAMCICLOVIR 500 MG/1
500 TABLET ORAL 3 TIMES DAILY
Qty: 21 TABLET | Refills: 0 | Status: SHIPPED | OUTPATIENT
Start: 2022-09-22 | End: 2022-09-29

## 2022-09-22 RX ORDER — PREDNISONE 20 MG/1
TABLET ORAL
Qty: 10 TABLET | Refills: 0 | Status: SHIPPED | OUTPATIENT
Start: 2022-09-22

## 2022-09-22 RX ORDER — OFLOXACIN 3 MG/ML
2 SOLUTION/ DROPS OPHTHALMIC 3 TIMES DAILY
Qty: 4 ML | Refills: 0 | Status: SHIPPED | OUTPATIENT
Start: 2022-09-22 | End: 2022-10-03

## 2022-09-22 RX ADMIN — PREDNISONE 60 MG: 20 TABLET ORAL at 18:41

## 2022-09-22 NOTE — ED NOTES
Rapid Assessment Note    History:   Deon Chaparro is a 42 year old male who presents with right-sided facial drooping and inability to close right eye.  Patient states that his symptoms developed 1 hour prior to arrival while he was at work.  Patient first noticed that he was having difficulty closing his right eye.  Patient denies weakness or paresthesias in any other extremities.  Patient has no prior similar history.  Patient states that he currently has a right ear infection.  No history of cold sores.  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  No speech difficulty.  No dizziness.  No headache.    Exam:   General:  Alert, interactive  Cardiovascular:  Well perfused  Lungs:  No respiratory distress, no accessory muscle use  Neuro:  Moving all 4 extremities  Skin:  Warm, dry  Psych:  Normal affect    Ear: No vesicular lesions observed and right ear canal or right pinna.  TM clear.  No bulging TM.  No surrounding erythema.  Cerumen present.    NIHSS:  Patient alert and keenly responsive.  Able to answer month and age.  Unable to blink right eye completely.  No gaze palsy.  No visual field deficits.  Unilateral paralysis of right-sided face with difficulty closing right eye and raise right eyebrow.  No forehead sparing.  No arm drift bilaterally.  No leg drift bilaterally.  No limb ataxia.  Able to complete finger-nose-finger and heel-to-shin bilaterally.  No language deficits.  No aphasia.  No dysarthria.  No extinction/inattention.  NIHSS score of 4.    Plan of Care:   I evaluated the patient and developed an initial plan of care. I discussed this plan and explained that I, or one of my partners, would be returning to complete the evaluation.  On my evaluation, patient's presentation suggestive of Bell's palsy given inability to raise right brow/no forehead sparing and no other focal neurological deficits to suggest CVA. No vesicular lesions over right ear to suggest [place] syndrome.  No history of HSV  infections. Patient will be moved to the back room for completion examination when room available.  Discussed care plan with patient who voiced understanding and agreement with plan.  Answered all questions.  Additional work-up and orders as listed in chart.  Care transition to one of my partners upon completion of rapid assessment.    I, Desire Garcia, am serving as a scribe to document services personally performed by Nile Matos DO, based on my observations and the provider's statements to me.    9/22/2022  [facility] [facility]    Portions of this medical record were completed by a scribe. UPON MY REVIEW AND AUTHENTICATION BY ELECTRONIC SIGNATURE, this confirms (a) I performed the applicable clinical services, and (b) the record is accurate.        Nile Matos DO  09/22/22 [id number]

## 2022-09-22 NOTE — ED PROVIDER NOTES
History     Chief Complaint:    Facial Droop      HPI   Deon Chaparro is a 42 year old male who presents with right facial droop.    Patient is a 42-year-old male who presents with drooping of the right face.  Patient's been seen in the emergency room recently due to severe headache and posterior auricular headache.  Patient was started on drops for otitis externa.  CT of the head was negative for any concerning event.  Over the last 24 hours patient has noticed weakness around the eye muscles in the face his eye was bothering him and presents the emergency room for reassessment.    Review of Systems  Positive for right-sided headache positive for irritation of the eye negative for fever chills all systems negative except as above    Allergies:    Shellfish Allergy      Medications:      gabapentin (NEURONTIN) 100 MG capsule  IBUPROFEN PO  LEVOTHYROXINE SODIUM PO  neomycin-polymyxin-hydrocortisone (CORTISPORIN) 3.5-37384-1 otic solution        Past Medical History:      Past Medical History:   Diagnosis Date     Esophageal reflux 4/25/2008     Thyroid disease      Patient Active Problem List    Diagnosis Date Noted     CARDIOVASCULAR SCREENING; LDL GOAL LESS THAN 160 02/10/2010     Priority: Medium     Esophageal reflux 04/25/2008     Priority: Medium        Past Surgical History:      Past Surgical History:   Procedure Laterality Date     DENTAL SURGERY         Family History:      Family History   Problem Relation Age of Onset     Genitourinary Problems Father         kidney stones     Genitourinary Problems Paternal Grandfather         kidney stones       Social History:    No Ref-Primary, Physician  The patient presents to the ED alone    Physical Exam     Patient Vitals for the past 24 hrs:   BP Temp Pulse Resp SpO2   09/22/22 1825 99/66 -- 64 -- 100 %   09/22/22 1558 (!) 140/98 97.5  F (36.4  C) 83 16 98 %       Physical Exam  Vitals reviewed.   HENT:      Head: Normocephalic.      Comments: There is  some tenderness in the outer ear canal.  There are some inflammatory change no vesicles noted     Right Ear: Tympanic membrane normal.      Left Ear: Tympanic membrane normal.      Nose: Nose normal.      Mouth/Throat:      Mouth: Mucous membranes are moist.   Eyes:      Pupils: Pupils are equal, round, and reactive to light.   Cardiovascular:      Rate and Rhythm: Normal rate.   Pulmonary:      Effort: Pulmonary effort is normal.   Abdominal:      General: Abdomen is flat.   Neurological:      General: No focal deficit present.      Mental Status: He is alert. Mental status is at baseline.      Cranial Nerves: Cranial nerve deficit present.      Motor: No weakness.      Coordination: Coordination normal.      Gait: Gait normal.      Comments: There is a clear Bell's palsy with weakness of the periocular and facial muscles on the right.   Psychiatric:         Mood and Affect: Mood normal.           Emergency Department Course     Laboratory:  Labs Ordered and Resulted from Time of ED Arrival to Time of ED Departure   CBC WITH PLATELETS AND DIFFERENTIAL       Result Value    WBC Count 5.7      RBC Count 5.26      Hemoglobin 15.9      Hematocrit 47.3      MCV 90      MCH 30.2      MCHC 33.6      RDW 12.1      Platelet Count 283      % Neutrophils 57      % Lymphocytes 29      % Monocytes 8      % Eosinophils 5      % Basophils 1      % Immature Granulocytes 0      NRBCs per 100 WBC 0      Absolute Neutrophils 3.3      Absolute Lymphocytes 1.7      Absolute Monocytes 0.4      Absolute Eosinophils 0.3      Absolute Basophils 0.0      Absolute Immature Granulocytes 0.0      Absolute NRBCs 0.0     BASIC METABOLIC PANEL   LYME DISEASE TOTAL ABS BLD WITH REFLEX TO CONFIRM CLIA         Emergency Department Course:             Reviewed:    I reviewed nursing notes and vitals    Assessments:  1855 I obtained history and examined the patient as noted above.   1945 I rechecked the patient and explained findings.       Consults:             Interventions:    Medications   predniSONE (DELTASONE) tablet 60 mg (60 mg Oral Given 9/22/22 1841)       Disposition:  The patient was discharged to home.     Impression & Plan        Medical Decision Making:  Patient presents with a few week history of headache on the right side otitis externa and now facial droop.  Examination is consistent with Bell's palsy.  I doubt necrotizing your infection due to lack of illness no fever well-appearing.  Cannot rule out Isma Bernardo as there is some inflammation of the outer ear canal but no vesicles were noted.  Treatment would be the same in the form of prednisone and antiviral.  Recommend follow-up with ENT if symptoms do not improve patient was offered reassurance recommendations to tape the eyelid shut oral steroid and antiviral and follow-up with ENT.  Patient was discharged in stable condition.  Covid-19  Deon Chaparro was evaluated during a global COVID-19 pandemic, which necessitated consideration that the patient might be at risk for infection with the SARS-CoV-2 virus that causes COVID-19.   Applicable protocols for evaluation were followed during the patient's care.   COVID-19 was considered as part of the patient's evaluation.    Diagnosis:    ICD-10-CM    1. Bell's palsy  G51.0        Discharge Medications:  Discharge Medication List as of 9/22/2022  6:49 PM      START taking these medications    Details   famciclovir (FAMVIR) 500 MG tablet Take 1 tablet (500 mg) by mouth 3 times daily for 7 days, Disp-21 tablet, R-0, Local Print      ofloxacin (OCUFLOX) 0.3 % ophthalmic solution Place 2 drops into the right ear 3 times daily for 11 days, Disp-4 mL, R-0, Local Print      polyvinyl alcohol (LIQUIFILM TEARS) 1.4 % ophthalmic solution Place 1 drop into the right eye as needed for dry eyes, Disp-15 mL, R-0, Local Print      predniSONE (DELTASONE) 20 MG tablet Take three tablets) each day for seven days, Disp-10 tablet, R-0, Local Print                Scribe Disclosure:  I, Sy Florentino MD, am serving as a scribe at 6:31 PM on 9/22/2022 to document services personally performed by Sy Florentino MD based on my observations and the provider's statements to me.      Sy Florentino MD  09/22/22 2739

## 2022-09-22 NOTE — ED TRIAGE NOTES
Pt presents for complaint of right sided facial droop. Pt states this started approx 1 hour ago. Entire right side of the patient's face is drooping including the right eyebrow. Speech normal. Extremity movement normal. ABC intact, A&Ox4.    Recent right sided ear infection.     Triage Assessment     Row Name 09/22/22 1600       Triage Assessment (Adult)    Airway WDL WDL       Respiratory WDL    Respiratory WDL WDL       Skin Circulation/Temperature WDL    Skin Circulation/Temperature WDL WDL       Cardiac WDL    Cardiac WDL WDL       Peripheral/Neurovascular WDL    Peripheral Neurovascular WDL WDL       Cognitive/Neuro/Behavioral WDL    Cognitive/Neuro/Behavioral WDL WDL

## 2022-09-22 NOTE — ED NOTES
Pt had a syncopal episode while having his IV started. Pt aroused to voice after approx 1 minute. Pale appearing and complaining of nausea. BP 77/45 initial and 87/45 repeat. Color improving.

## 2022-09-22 NOTE — DISCHARGE INSTRUCTIONS
Your symptoms of ear pain and then drooping of the face are classic for likely Bell's palsy.  This is related to the inflammation of the facial nerve.  Take prednisone for 7-day course.  Use Famvir for 7 days.  Change Cortisporin to Ocuflox in the ear use artificial tears to moisten the eye and use Scotch tape to keep your eye closed at night.  Please follow-up with ENT if your ear pain or facial droop do not improve with time

## 2022-09-23 LAB — B BURGDOR IGG+IGM SER QL: 0.06

## 2023-01-07 ENCOUNTER — HEALTH MAINTENANCE LETTER (OUTPATIENT)
Age: 43
End: 2023-01-07

## 2024-02-10 ENCOUNTER — HEALTH MAINTENANCE LETTER (OUTPATIENT)
Age: 44
End: 2024-02-10

## 2024-10-27 LAB
BASOPHILS # BLD AUTO: 0.1 10E3/UL (ref 0–0.2)
BASOPHILS NFR BLD AUTO: 1 %
EOSINOPHIL # BLD AUTO: 0.3 10E3/UL (ref 0–0.7)
EOSINOPHIL NFR BLD AUTO: 4 %
ERYTHROCYTE [DISTWIDTH] IN BLOOD BY AUTOMATED COUNT: 12.3 % (ref 10–15)
HCT VFR BLD AUTO: 40.6 % (ref 40–53)
HGB BLD-MCNC: 14.6 G/DL (ref 13.3–17.7)
IMM GRANULOCYTES # BLD: 0 10E3/UL
IMM GRANULOCYTES NFR BLD: 0 %
LYMPHOCYTES # BLD AUTO: 2.4 10E3/UL (ref 0.8–5.3)
LYMPHOCYTES NFR BLD AUTO: 36 %
MCH RBC QN AUTO: 31.4 PG (ref 26.5–33)
MCHC RBC AUTO-ENTMCNC: 36 G/DL (ref 31.5–36.5)
MCV RBC AUTO: 87 FL (ref 78–100)
MONOCYTES # BLD AUTO: 0.5 10E3/UL (ref 0–1.3)
MONOCYTES NFR BLD AUTO: 8 %
NEUTROPHILS # BLD AUTO: 3.4 10E3/UL (ref 1.6–8.3)
NEUTROPHILS NFR BLD AUTO: 51 %
NRBC # BLD AUTO: 0 10E3/UL
NRBC BLD AUTO-RTO: 0 /100
PLATELET # BLD AUTO: 244 10E3/UL (ref 150–450)
RBC # BLD AUTO: 4.65 10E6/UL (ref 4.4–5.9)
WBC # BLD AUTO: 6.6 10E3/UL (ref 4–11)

## 2024-10-27 PROCEDURE — 99285 EMERGENCY DEPT VISIT HI MDM: CPT | Mod: 25

## 2024-10-27 PROCEDURE — 93005 ELECTROCARDIOGRAM TRACING: CPT

## 2024-10-27 PROCEDURE — 80048 BASIC METABOLIC PNL TOTAL CA: CPT | Performed by: EMERGENCY MEDICINE

## 2024-10-27 PROCEDURE — 85004 AUTOMATED DIFF WBC COUNT: CPT | Performed by: EMERGENCY MEDICINE

## 2024-10-27 PROCEDURE — 36415 COLL VENOUS BLD VENIPUNCTURE: CPT | Performed by: EMERGENCY MEDICINE

## 2024-10-27 PROCEDURE — 84484 ASSAY OF TROPONIN QUANT: CPT | Performed by: EMERGENCY MEDICINE

## 2024-10-27 ASSESSMENT — COLUMBIA-SUICIDE SEVERITY RATING SCALE - C-SSRS
1. IN THE PAST MONTH, HAVE YOU WISHED YOU WERE DEAD OR WISHED YOU COULD GO TO SLEEP AND NOT WAKE UP?: NO
6. HAVE YOU EVER DONE ANYTHING, STARTED TO DO ANYTHING, OR PREPARED TO DO ANYTHING TO END YOUR LIFE?: NO
2. HAVE YOU ACTUALLY HAD ANY THOUGHTS OF KILLING YOURSELF IN THE PAST MONTH?: NO

## 2024-10-28 ENCOUNTER — APPOINTMENT (OUTPATIENT)
Dept: GENERAL RADIOLOGY | Facility: CLINIC | Age: 44
End: 2024-10-28
Attending: EMERGENCY MEDICINE
Payer: COMMERCIAL

## 2024-10-28 ENCOUNTER — HOSPITAL ENCOUNTER (EMERGENCY)
Facility: CLINIC | Age: 44
Discharge: HOME | End: 2024-10-28
Attending: EMERGENCY MEDICINE | Admitting: EMERGENCY MEDICINE
Payer: COMMERCIAL

## 2024-10-28 VITALS
TEMPERATURE: 98 F | OXYGEN SATURATION: 98 % | BODY MASS INDEX: 26.7 KG/M2 | SYSTOLIC BLOOD PRESSURE: 107 MMHG | HEIGHT: 75 IN | WEIGHT: 214.73 LBS | HEART RATE: 59 BPM | DIASTOLIC BLOOD PRESSURE: 78 MMHG | RESPIRATION RATE: 18 BRPM

## 2024-10-28 DIAGNOSIS — N28.9 RENAL INSUFFICIENCY: ICD-10-CM

## 2024-10-28 DIAGNOSIS — R07.9 CHEST PAIN, UNSPECIFIED TYPE: ICD-10-CM

## 2024-10-28 LAB
ANION GAP SERPL CALCULATED.3IONS-SCNC: 13 MMOL/L (ref 7–15)
ATRIAL RATE - MUSE: 76 BPM
BUN SERPL-MCNC: 13.4 MG/DL (ref 6–20)
CALCIUM SERPL-MCNC: 9 MG/DL (ref 8.8–10.4)
CHLORIDE SERPL-SCNC: 101 MMOL/L (ref 98–107)
CREAT SERPL-MCNC: 1.31 MG/DL (ref 0.67–1.17)
DIASTOLIC BLOOD PRESSURE - MUSE: NORMAL MMHG
EGFRCR SERPLBLD CKD-EPI 2021: 69 ML/MIN/1.73M2
GLUCOSE SERPL-MCNC: 116 MG/DL (ref 70–99)
HCO3 SERPL-SCNC: 25 MMOL/L (ref 22–29)
HOLD SPECIMEN: NORMAL
HOLD SPECIMEN: NORMAL
INTERPRETATION ECG - MUSE: NORMAL
P AXIS - MUSE: 54 DEGREES
POTASSIUM SERPL-SCNC: 3.7 MMOL/L (ref 3.4–5.3)
PR INTERVAL - MUSE: 178 MS
QRS DURATION - MUSE: 88 MS
QT - MUSE: 372 MS
QTC - MUSE: 418 MS
R AXIS - MUSE: 65 DEGREES
SODIUM SERPL-SCNC: 139 MMOL/L (ref 135–145)
SYSTOLIC BLOOD PRESSURE - MUSE: NORMAL MMHG
T AXIS - MUSE: 68 DEGREES
TROPONIN T SERPL HS-MCNC: <6 NG/L
TROPONIN T SERPL HS-MCNC: <6 NG/L
VENTRICULAR RATE- MUSE: 76 BPM

## 2024-10-28 PROCEDURE — 250N000011 HC RX IP 250 OP 636: Performed by: EMERGENCY MEDICINE

## 2024-10-28 PROCEDURE — 84484 ASSAY OF TROPONIN QUANT: CPT | Performed by: EMERGENCY MEDICINE

## 2024-10-28 PROCEDURE — 96374 THER/PROPH/DIAG INJ IV PUSH: CPT

## 2024-10-28 PROCEDURE — 71046 X-RAY EXAM CHEST 2 VIEWS: CPT

## 2024-10-28 PROCEDURE — 36415 COLL VENOUS BLD VENIPUNCTURE: CPT | Performed by: EMERGENCY MEDICINE

## 2024-10-28 RX ORDER — KETOROLAC TROMETHAMINE 15 MG/ML
15 INJECTION, SOLUTION INTRAMUSCULAR; INTRAVENOUS ONCE
Status: COMPLETED | OUTPATIENT
Start: 2024-10-28 | End: 2024-10-28

## 2024-10-28 RX ADMIN — KETOROLAC TROMETHAMINE 15 MG: 15 INJECTION, SOLUTION INTRAMUSCULAR; INTRAVENOUS at 01:48

## 2024-10-28 ASSESSMENT — ACTIVITIES OF DAILY LIVING (ADL)
ADLS_ACUITY_SCORE: 0
ADLS_ACUITY_SCORE: 0

## 2024-10-28 NOTE — ED TRIAGE NOTES
Pt started having feeling lightheaded, having pain in his chest, left arm and neck 45 min ago

## 2024-10-28 NOTE — ED PROVIDER NOTES
"  Emergency Department Note      History of Present Illness     Chief Complaint   Chest Pain      HPI   Deon Chaparro is a 44 year old male with a known history of GERD presenting with chest pain.  Patient reports around 10:30 PM developing a left-sided chest pain as well as a left arm pain that has been intermittent.  He describes the pain as sharp/dull.  Pain seems to worsen with deep inspiration.  He has not taken anything for analgesia.  He denies any fever, chills, cough, dyspnea, abdominal pain, nausea, vomiting, diarrhea or other symptoms.  No history of blood clots, recent prolonged immobilization/travel.  No family history of early MI.  He denies alcohol or drug use.    Independent Historian   None    Review of External Notes   none    Past Medical History     Medical History and Problem List   Past Medical History:   Diagnosis Date    Esophageal reflux 4/25/2008    Thyroid disease        Medications   famciclovir (FAMVIR) 500 MG tablet  gabapentin (NEURONTIN) 100 MG capsule  IBUPROFEN PO  LEVOTHYROXINE SODIUM PO  polyvinyl alcohol (LIQUIFILM TEARS) 1.4 % ophthalmic solution  predniSONE (DELTASONE) 20 MG tablet        Surgical History   Past Surgical History:   Procedure Laterality Date    DENTAL SURGERY         Physical Exam     Patient Vitals for the past 24 hrs:   BP Temp Temp src Pulse Resp SpO2 Height Weight   10/28/24 0030 116/77 -- -- 59 -- 98 % -- --   10/27/24 2344 (!) 128/96 98  F (36.7  C) Temporal 77 18 100 % 1.905 m (6' 3\") 97.4 kg (214 lb 11.7 oz)     Physical Exam  Nursing note and vitals reviewed.  Constitutional: Well nourished. Resting comfortably.   Eyes: Conjunctiva normal.  Pupils are equal, round, and reactive to light.   ENT: Nose normal. Mucous membranes pink and moist.    Neck: Normal range of motion.  CVS: Normal rate, regular rhythm.  Normal heart sounds.    Pulmonary: Lungs clear to auscultation bilaterally. No wheezes/rales/rhonchi.  GI: Abdomen soft. Nontender, " nondistended. No rigidity or guarding.    MSK: No calf tenderness or swelling.  Neuro: Alert. Follows simple commands.  Skin: Skin is warm and dry. No rash noted.   Psychiatric: Normal affect.       Diagnostics     Lab Results   Labs Ordered and Resulted from Time of ED Arrival to Time of ED Departure   BASIC METABOLIC PANEL - Abnormal       Result Value    Sodium 139      Potassium 3.7      Chloride 101      Carbon Dioxide (CO2) 25      Anion Gap 13      Urea Nitrogen 13.4      Creatinine 1.31 (*)     GFR Estimate 69      Calcium 9.0      Glucose 116 (*)    TROPONIN T, HIGH SENSITIVITY - Normal    Troponin T, High Sensitivity <6     TROPONIN T, HIGH SENSITIVITY - Normal    Troponin T, High Sensitivity <6     CBC WITH PLATELETS AND DIFFERENTIAL    WBC Count 6.6      RBC Count 4.65      Hemoglobin 14.6      Hematocrit 40.6      MCV 87      MCH 31.4      MCHC 36.0      RDW 12.3      Platelet Count 244      % Neutrophils 51      % Lymphocytes 36      % Monocytes 8      % Eosinophils 4      % Basophils 1      % Immature Granulocytes 0      NRBCs per 100 WBC 0      Absolute Neutrophils 3.4      Absolute Lymphocytes 2.4      Absolute Monocytes 0.5      Absolute Eosinophils 0.3      Absolute Basophils 0.1      Absolute Immature Granulocytes 0.0      Absolute NRBCs 0.0         Imaging   XR Chest 2 Views   Final Result   IMPRESSION: No evidence of active cardiopulmonary disease.           EKG   ECG results from 10/28/24   EKG 12-lead, tracing only     Value    Systolic Blood Pressure     Diastolic Blood Pressure     Ventricular Rate 76    Atrial Rate 76    NM Interval 178    QRS Duration 88        QTc 418    P Axis 54    R AXIS 65    T Axis 68    Interpretation ECG      Sinus rhythm  Normal ECG  When compared with ECG of 26-Apr-2018 00:17,  No significant change was found           Independent Interpretation   CXR: No pneumothorax, infiltrate, pleural effusion, or pulmonary edema.    ED Course      Medications  Administered   Medications   ketorolac (TORADOL) injection 15 mg (has no administration in time range)       Procedures   Procedures     Discussion of Management   None    ED Course        Additional Documentation  None    Medical Decision Making / Diagnosis     CMS Diagnoses: None    MIPS       None    MDM   Deon Chaparro is a 44 year old male who presents with chest pain.  Today's work up in the Emergency Department is negative.  The differential diagnosis of chest pain is broad and includes life threatening etiologies such as acute coronary syndrome, myocardial infarction, pulmonary embolism, acute aortic dissection, amongst others.  The patient's EKG was nonischemic and troponin was normal x 2.  The patient is low risk for PE and is PERC negative; I feel the risks of CT imaging outweighs any benefits.  Chest xray reveals no evidence of pneumonia or pneumothorax.  No abdominal tenderness and I doubt intraabdominal source to explain pain.  Labs with noted mild renal insufficiency though appears more chronic and patient aware of this.  No serious etiology for the chest pain were detected today during this visit.  I suspect this pain is secondary to pleurisy; reported improvement after IV toradol.  Close follow up with primary care is indicated should the pain continue, as further work up may be performed; this was made clear to the patient, who understands.       Disposition   The patient was discharged.     Diagnosis     ICD-10-CM    1. Renal insufficiency  N28.9       2. Chest pain, unspecified type  R07.9            Discharge Medications   Discharge Medication List as of 10/28/2024  2:29 AM            DO Taina Nuñez Lindsey E, DO  10/28/24 0537

## 2025-03-08 ENCOUNTER — HEALTH MAINTENANCE LETTER (OUTPATIENT)
Age: 45
End: 2025-03-08